# Patient Record
Sex: FEMALE | Race: WHITE | NOT HISPANIC OR LATINO | Employment: FULL TIME | URBAN - METROPOLITAN AREA
[De-identification: names, ages, dates, MRNs, and addresses within clinical notes are randomized per-mention and may not be internally consistent; named-entity substitution may affect disease eponyms.]

---

## 2021-06-14 ENCOUNTER — OFFICE VISIT (OUTPATIENT)
Dept: UROLOGY | Facility: CLINIC | Age: 36
End: 2021-06-14
Attending: OBSTETRICS & GYNECOLOGY
Payer: COMMERCIAL

## 2021-06-14 VITALS
SYSTOLIC BLOOD PRESSURE: 105 MMHG | HEART RATE: 66 BPM | WEIGHT: 134.5 LBS | HEIGHT: 64 IN | BODY MASS INDEX: 22.96 KG/M2 | DIASTOLIC BLOOD PRESSURE: 72 MMHG

## 2021-06-14 DIAGNOSIS — N39.3 STRESS INCONTINENCE OF URINE: ICD-10-CM

## 2021-06-14 DIAGNOSIS — R10.2 PAIN IN VESTIBULE OF VULVA: ICD-10-CM

## 2021-06-14 DIAGNOSIS — N81.6 RECTOCELE: Primary | ICD-10-CM

## 2021-06-14 PROCEDURE — G0463 HOSPITAL OUTPT CLINIC VISIT: HCPCS

## 2021-06-14 PROCEDURE — 99204 OFFICE O/P NEW MOD 45 MIN: CPT | Performed by: OBSTETRICS & GYNECOLOGY

## 2021-06-14 RX ORDER — EVENING PRIMROSE OIL 500 MG
CAPSULE ORAL
COMMUNITY
Start: 2021-06-10

## 2021-06-14 RX ORDER — COPPER 313.4 MG/1
INTRAUTERINE DEVICE INTRAUTERINE
COMMUNITY

## 2021-06-14 RX ORDER — CHOLECALCIFEROL (VITAMIN D3) 50 MCG
TABLET ORAL
COMMUNITY
Start: 2021-06-10

## 2021-06-14 RX ORDER — DIAPER,BRIEF,ADULT, DISPOSABLE
EACH MISCELLANEOUS
COMMUNITY
Start: 2021-06-10

## 2021-06-14 RX ORDER — LIDOCAINE HYDROCHLORIDE 40 MG/ML
SOLUTION TOPICAL
Qty: 50 ML | Refills: 3 | Status: SHIPPED | OUTPATIENT
Start: 2021-06-14

## 2021-06-14 RX ORDER — VITAMIN A ACETATE, .BETA.-CAROTENE, ASCORBIC ACID, CHOLECALCIFEROL, .ALPHA.-TOCOPHEROL ACETATE, DL-, THIAMINE MONONITRATE, RIBOFLAVIN, NIACINAMIDE, PYRIDOXINE HYDROCHLORIDE, FOLIC ACID, CYANOCOBALAMIN, CALCIUM CARBONATE, FERROUS FUMARATE, ZINC OXIDE, AND CUPRIC OXIDE 2000; 2000; 120; 400; 22; 1.84; 3; 20; 10; 1; 12; 200; 27; 25; 2 [IU]/1; [IU]/1; MG/1; [IU]/1; MG/1; MG/1; MG/1; MG/1; MG/1; MG/1; UG/1; MG/1; MG/1; MG/1; MG/1
1 TABLET ORAL
COMMUNITY

## 2021-06-14 ASSESSMENT — ANXIETY QUESTIONNAIRES
GAD7 TOTAL SCORE: 5
2. NOT BEING ABLE TO STOP OR CONTROL WORRYING: SEVERAL DAYS
5. BEING SO RESTLESS THAT IT IS HARD TO SIT STILL: NOT AT ALL
7. FEELING AFRAID AS IF SOMETHING AWFUL MIGHT HAPPEN: SEVERAL DAYS
3. WORRYING TOO MUCH ABOUT DIFFERENT THINGS: SEVERAL DAYS
6. BECOMING EASILY ANNOYED OR IRRITABLE: SEVERAL DAYS
1. FEELING NERVOUS, ANXIOUS, OR ON EDGE: SEVERAL DAYS

## 2021-06-14 ASSESSMENT — PATIENT HEALTH QUESTIONNAIRE - PHQ9
SUM OF ALL RESPONSES TO PHQ QUESTIONS 1-9: 2
5. POOR APPETITE OR OVEREATING: NOT AT ALL

## 2021-06-14 ASSESSMENT — PAIN SCALES - GENERAL: PAINLEVEL: NO PAIN (0)

## 2021-06-14 ASSESSMENT — MIFFLIN-ST. JEOR: SCORE: 1290.09

## 2021-06-14 NOTE — LETTER
Date:June 27, 2021      Patient was self referred, no letter generated. Do not send.        [facility] Health Information

## 2021-06-14 NOTE — PATIENT INSTRUCTIONS
Dear Swetha, it was nice to meet you today. Today we discussed that you have a mild rectocele and stress urinary incontinence      Today we discussed the following options for your stress urinary incontinence  -kegel exercises daily ( 20 in the morning and 20 at night) .Continue pelvic PT for this  --Minimizing  acidic (citrus fruits especially) , caffeinated , alcoholic or carbonated drinks or artificial sweetners as these are bladder irritants -Limiting fluid intake to 60-80 oz /day.   -avoiding drinking fluids after 6 pm to avoid incontinence during the night .   - We also discussed incontinence pessary as an option where you were a small silicone vaginal ring to provide support for your urethra and reduce stress incontinence symptoms. This will require cleaning pessary regularly. We have made a pessary fitting appointment for you.       If the above doesn't work, we can offer incontinence sling or urethral bulking agent injections. The incontinence slings generally have better outcomes. We discussed today various surgical options such as mesh sling/bladder neck suspension, and fascial sling as well as bulking agent injection. We can discuss more if surgery becomes something you like to pursue but this will have to wait till you are done with childbearing.     Please call our clinic with any questions at  or send a My chart message    Yuliya Mcrae MD, [facility]  Female Pelvic Medicine and Reconstructive Surgery  [facility]      Bladder Control Problems    If you or someone you know is affected by loss of bladder control, you are not alone. Urinary incontinence affects 30 to 50% of women; although the rates go up with age, incontinence among young women is quite common. This condition affects men and women, although it is nearly twice as common in women. The prevalence of urinary incontinence does increase with age, but it is not considered normal at any age. Doctors should  routinely question women over age 65 about bladder problems, including overactive bladder.  Certain events or conditions may make a woman more likely to experience urinary incontinence. Sometimes, very clear-cut events such as pregnancy, vaginal delivery, surgery, radiation or accidental injury can lead to these kinds of problems; other times, causes may be much less well-defined. Some other causes include:  ? Chronic constipation, which causes excessive bearing down.   ? Some lung conditions, where pressure from breathing disorders can increase the pressure in the abdomen and pelvis.   ? Neurological conditions, like multiple sclerosis or spina bifida, where nerves and/or muscles may not function correctly.   ? Certain occupations (usually those that involve heavy lifting or exertion) may also increase the risk.   ? Some kinds of urinary incontinence can be related to medications taken for other health conditions (such as diuretics), or smoking and caffeine use, and obesity certainly has an effect.   ? Uncommonly, certain other health conditions such as kidney or bladder stones, or even some forms of cancer can cause the bladder to lose urine.   ? And, in many cases, there is no obvious underlying reason for why bladder control problems occur.   Many women who have these kinds of bladder control problems are reluctant to discuss them with anyone, or are embarrassed to acknowledge that they have a problem, even to themselves. Sometimes women are made to feel that these conditions are  normal,  especially as they get older, and that, since bladder control problems like this are rarely life-threatening, they are not really a problem. These points of view are often shared among family and friends, or even among some healthcare providers.  But the truth of the matter is that urinary incontinence can have a very significant impact. We know that it can undermine your sense of well-being and self-worth, and your ability to  live your life the way you want. Scientific studies indicate that quality of life measures significantly decrease when a woman experiences these kinds of bladder control problems. After experiencing these problems, women may begin to stop exercising or participating in physical or social activities, which can further reduce health and quality of life. Work activities, travel and intimacy also may suffer as a result.  But there is no reason to allow this to continue. The good news is that 80 to 90 % of women who seek treatment will experience significant improvement. A wide array of treatment options, ranging from behavioral and diet changes all the way to surgical options exist, and are used every day to help women recover parts of their lives they may have let go. Get evaluated and review treatment options appropriate for your urinary incontinence. The more you know, the more confident you will be in choosing the direction of treatment.  Types of Bladder Control Problems  Female urinary incontinence can be grouped in several distinct categories, although women often have symptoms found in more than one category (i.e., mixed incontinence).  Stress Incontinence: Urine leakage occurs with increases in abdominal pressure (hence, mechanical  stress ).        Stress urinary incontinence is loss of urine that occurs at the same time as physical activities that increase abdominal pressure (such as sneezing, coughing, laughing, and exercising). These activities can increase the pressure within the bladder, which behaves like a balloon filled with liquid. The rise in pressure can push urine out through the urethra, especially when the support to the urethra has been weakened; this is what we call stress urinary incontinence.      Pregnancy and delivery can have significant effects on the mechanisms of continence. Obstetricians are becoming more and more aware of the risks of injury to the pelvic floor caused by vaginal  "delivery. Excessive stretching of the supportive tissues, muscles and nerves, can cause permanent defects even after post-pregnancy healing. This may lead to various pelvic floor support problems for the surrounding organs. Although the urinary incontinence often resolves in the first few months after delivery, its initial presentation may signal the development of more troublesome incontinence in the future.      Some women with stress incontinence may note only occasional leaks, only with aggressive exercise, colds or allergies, or at times when the bladder is especially full. Other women have a great deal of leakage with simple activities such as getting up out of a chair, or simple walking. Although the severity may vary, many women find that these symptoms begin to limit their physical or social activities, and can have a serious impact on quality of life.  Urge Incontinence: Often referred to as  overactive bladder.  Inability to hold urine long enough to reach restroom.       The term  overactive bladder  is sometimes used to refer to any of the following conditions:    Frequency (more than 8 voids in each 24 hours)     Urgency (a powerful urge to urinate, that is difficult to put off)     Nocturia (waking up twice or more at night to urinate)     Urge incontinence (leakage of urine associated with an urge to urinate, or not making it to the bathroom in time)       When leakage of urine is accompanied by a sensation of the need to urinate, or the impending sense that a large leak is going to happen, this is often what is known as urge incontinence. Unlike stress incontinence, this usually represents a bladder \"squeeze\" or contraction, occurring at an unwelcome time. Often, people with urge incontinence also have increased urinary frequency, have to rush to the bathroom frequently, or wake up more than once or twice at night to urinate. You may also notice severe urgency and leakage when driving into the " driveway, placing the key in the front door, running water or with temperature changes.      These are very common conditions, and their impact can vary widely. Their causes are less well understood than in stress incontinence. Occasionally, there may be an underlying reason, like neurologic or inflammatory conditions; in most cases, no particular cause can be identified. Whether or not an underlying cause is identified, the effects of overactive bladder and urge incontinence can be significant.  Mixed Incontinence: When two or more causes contribute to urinary incontinence. Often refers to the presence of both stress and urge incontinence. For example, someone has the combination of stress incontinence (leaking with coughing, sneezing, exercise, etc.) and urge incontinence (leaking along with a need to get to the bathroom), this is known as mixed urinary incontinence. Often, a woman may first experience one kind of leaking, and finds that the other begins to occur later.  Overflow Incontinence: Leakage or  spill-over  of urine when the quantity of urine exceeds the bladder's capacity to hold it. This generally happens when there is some blockage or obstruction to the bladder's emptying; the bladder is unable to empty well, and small amounts of leakage happen frequently. This kind of leakage is less common among women, unless they have had bladder surgery, vaginal prolapse, or some less common condition.  Functional Incontinence: Leakage (usually resulting from one or more causes) due to factors impairing your ability to reach the restroom in time because of physical conditions (e.g., arthritis). This may or may not represent a problem of the pelvic floor, but should certainly be addressed with a health-care provider.  Fistula or Diverticulum: When urine collects in a pouch within the urethra, or flows directly through an abnormal tract into the vagina. They usually happen after some kind of surgery, trauma or  radiation to the area and are relatively uncommon.      What is Pelvic Organ Prolapse?    Pelvic organ prolapse is a medical condition that occurs when the normal support of the vagina is lost, resulting in  sagging  or dropping of the bladder, urethra, cervix and rectum. As the prolapse of the vagina and uterus progresses, women can feel bulging tissue protruding through the opening of the vagina.     Causes and Risk Factors  By studying large numbers of women with and without prolapse, researchers and urogynecologists have identified certain risk factors that predispose, cause, promote or worsen pelvic organ prolapse. The strength of our bones, muscles and connective tissue are influenced by our genes and our race. Some women are born with weaker tissues and are therefore at risk to develop prolapse.  women are more likely than  women to develop pelvic organ prolapse. Loss of pelvic support can occur when any part of the pelvic floor is injured during vaginal delivery, surgery, pelvic radiation or back and pelvic fractures during falls or motor vehicle accidents. Hysterectomy and other procedures done to treat pelvic organ prolapse also are associated with future development of prolapse. Some other conditions that promote prolapse include: constipation and chronic straining, smoking, chronic coughing and heavy lifting. Obesity, like smoking, is one of the few modifiable risk factors. Women who are obese have a 40 to 75% increased risk of pelvic organ prolapse. Aging, menopause, debilitating nerve and muscle diseases contribute to the deterioration of pelvic floor strength and the development of prolapse.   Incidence  We do not know exactly how common pelvic organ prolapse is because research is limited to women who seek health care. It is estimated that nearly 50% of all women between the ages of 50 and 79 have some form of prolapse. The lifetime risk that a woman will have surgery for the  correction of prolapse or urinary incontinence in the [place] is about 11%. We also know that only one-third of these women will undergo repeat corrective surgery for these conditions. Approximately 300,000 procedures for correction of pelvic organ prolapse are performed each year in the [place]. We believe that is just the tip of the iceberg as many women manage their prolapse without surgery.     Symptoms  Some loss of support is a very common finding upon physical exam in women, many of whom do not have bothersome symptoms. Those women who are uncomfortable often describe the very first signs as subtle--such as an inability to keep a tampon inside the vagina, dampness in underwear or discomfort due to dryness during intercourse.   As the prolapse gets worse, some women complain of:     A bulging, pressure or heavy sensation in the vagina that worsens by the end of the day or during bowel movements     The feeling that they are  sitting on a ball      Needing to push stool out of the rectum by placing their fingers into the vagina during bowel movement     Difficulty starting to urinate, a weak or spraying stream of urine     Urinary frequency or the sensation that they are not emptying their bladder well     The need to lift up the bulging vagina or uterus to start urination     Urine leakage with intercourse     Types of Prolapse   Anterior Vaginal Prolapse (also known as cystocele)  This type of prolapse occurs when the wall between the vagina and the bladder stretches or detaches from its attachment on the pelvic bones. This loss of support allows the bladder to prolapse or fall down into the vagina.   Most women do not have symptoms when the anterior vaginal prolapse is mild. As it progresses outside the opening of the vagina, the prolapsed bladder may not empty well which can lead to urinary frequency, night time voiding, loss of bladder control and recurrent bladder infections. Strengthening  pelvic muscles may improve the support to the bladder and neighboring organs and reduce symptoms. In addition, women can get temporary support by wearing a device called a vaginal pessary. It works much like a knee or ankle brace would support a weak joint. When these efforts are inadequate surgery is available to elevate the bladder and other internal organs to their proper position.     Posterior Vaginal Prolapse (also known as rectocele)  Weakening and stretching of the back wall of the vagina allows the rectum to bulge into and out of the vagina. Most often, the damage to the back wall of the vagina occurs during vaginal childbirth, although not everyone who has delivered a child vaginally will develop a rectocele. Mild rectoceles rarely cause symptoms. However, straining with constipation puts significant pressure on the weak vaginal wall and can further thin it out. Avoiding constipation may prevent progression and also reduce symptoms from the rectocele. Some women may find benefit from pelvic floor muscle strengthening and vaginal pessaries. When these low risk interventions are insufficient to relieve symptoms, surgery is performed to reinforce the posterior vaginal wall. This picture shows what a rectocele looks like from the outside.     Uterine Prolapse  When the supporting ligaments and muscles of the pelvic floor that keep the uterus in the pelvis are damaged, the cervix and uterus descend into and eventually out of the vagina. Often, uterine prolapse is associated with loss of vaginal wall support (cystocele, rectocele). When the cervix protrudes outside the vagina, it can develop ulcers from rubbing on underwear or protective pads. There is a risk that these ulcers will bleed and become infected. This picture shows what uterine prolapse looks like from the outside. As with other forms of prolapse, it is not until the uterine descent is bothersome that treatment is necessary. Women who have uterine  prolapse often report pelvic pressure, the need to sit or lay down to relieve the discomfort, a sensation that their insides are falling out, difficulty emptying their bladder and urine leakage. Strengthening the pelvic muscles with Kegel exercises, avoiding heavy lifting, constipation, and weight gain may reduce the risk of progression of uterine descent. Additional treatment options include pessary devices which provide support when worn or surgery.     Vaginal Prolapse after Hysterectomy  If a woman has already had a hysterectomy, the very top of the vagina (where the uterus used to be) can become detached from its supporting ligaments. This can results in the tube of the vagina turning inside out. This condition is also known as vaginal  vault  prolapse. Depending upon how extensively the top of the vagina is turning inside out, one or several pelvic organs (such as the bladder, small and large bowel) will prolapse into the protruding bulge. Symptoms depend on which organs prolapse. When the bladder is involved, women report difficulty in starting to urinate, and emptying their bladder well. If it is the bowel then many report the need to push up the vaginal bulge and strain to have a bowel movement. Skin sores may develop if the bulge is rubbing on pads or underwear. A pessary may provide support for the bulge otherwise surgery is recommended. This is a picture of a vaginal vault prolapse.     Rectal Prolapse  The rectum is the name given to the last 6 inches of the colon. Like the vagina and uterus, the rectum is normally securely attached to the bony pelvis by ligaments and muscles. Infrequently, the supporting structures stretch or detach from the rectal wall which results in the rectum relapsing through the anus. This looks like red, often donut shaped soft tissue coming through the anus. Early on, it is most often noticed on the toilet after a bowel movement, and can be confused with a large hemorrhoid.  Conditions associated with straining such as chronic constipation or diarrhea, nerve and muscle weakness (paralysis or multiple sclerosis) and advancing age are risk factors for rectal prolapse. Women with rectal prolapse often report the following symptoms: pain during bowel movements, mucus or blood discharge from the protruding tissue, loss of control of bowel movements, and soft, red tissue protruding from the anus. It is very important to be clear in describing where the bulging tissue is coming from (opening of the anus or the vagina) when you seek help as both conditions may be present simultaneously. Treatment for a rectocele and rectal prolapse are different.     Source:  [employer]  Original publication date: May, 2008;  Website:  [email or web address]

## 2021-06-14 NOTE — LETTER
2021       RE: Swetha Raza  [address]  [place] [address]     Dear Colleague,    Thank you for referring your patient, Swetha Raza, to the [facility] [place] at [facility]. Please see a copy of my visit note below.    2021    Referring Provider: Referred Self, MD  No address on file    Primary Care Provider: No primary care provider on file.    CC: Pelvic organ prolapse    HPI:  Swetha Raza is a 35 year old female  s/p last vaginal delivery on 3/23/21who presents for evaluation of pelvic organ prolapse and incontinence    Urinary Symptoms/Voiding function  Has had stress urinary incontinence for several years. Leaks with exercise, movement, coughing etc. Currently doing pelvic floor physical therapy which she started during her pregnancy. She does not have urgency leakage though has urinary frequency. She drinks approximately 120 oz of water per day ( including some carbonated water) but no other bladder irritants. She feels like she empties her bladder ok. Used to have recurrent UTIs in the past but last infection was over a year ago.     Pelvic Organ Prolapse Symptoms  Has sensation of bulge/pressure since the delivery of her last baby 3 months ago and has been told she has prolapse. She says pelvic PT is helping (also biofeedback) and her symptoms are less frequent. She hasn't noticed protrusion.     Gastrointestinal Symptoms:  Takes miralax for constipation and it is helping. Does not splint to defecate. No fecal incontinence    Sexual function/Pelvic floor pain/GYN:   Sexually active. Some pain with intercourse mostly with impact on her cervix.   IUD was placed post partum and she still has it.     Relevant Medical History:    Diabetes? no  High Blood pressure? no     Recurrent UTIs? Yes but resolved  Sleep Apnea? no  Obesity? no  History of Blood clots? no  Other medical problems: Had heart  palpitations during pregnancy with negative Echo on 1/28/21.     Echocardiogram 1/28/21.   Interpretation Summary   * The left ventricular systolic function is normal, estimated LVEF 55-60%.   * Left ventricular wall motion is normal.   * Normal right ventricular size and systolic function.   * There is no hemodynamically significant valvular heart disease.   * No pulmonary hypertension, estimated right ventricular systolic pressure  is 22 mmHg.   * No prior study.    Recommendations   * This is a normal study.    Surgical History:      No past surgical history on file.    OB/Gyn History:  OB History   No obstetric history on file.       Medications/Vitamins/Supplements:   Current Outpatient Medications   Medication     Evening Primrose Oil 500 MG CAPS     lecithin (LECITHIN) 1200 MG CAPS capsule     lidocaine (XYLOCAINE) 4 % external solution     vitamin B complex with vitamin C (VITAMIN  B COMPLEX) tablet     vitamin D3 (CHOLECALCIFEROL) 50 mcg (2000 units) tablet     paragard intrauterine copper device     Prenatal Vit-Fe Fumarate-FA (PNV PRENATAL PLUS MULTIVITAMIN) 27-1 MG TABS per tablet     No current facility-administered medications for this visit.          Medical History:      No past medical history on file.  ROS  Social History    Social History     Socioeconomic History     Marital status:      Spouse name: Not on file     Number of children: Not on file     Years of education: Not on file     Highest education level: Not on file   Occupational History     Not on file   Social Needs     Financial resource strain: Not on file     Food insecurity     Worry: Not on file     Inability: Not on file     Transportation needs     Medical: Not on file     Non-medical: Not on file   Tobacco Use     Smoking status: Not on file   Substance and Sexual Activity     Alcohol use: Not on file     Drug use: Not on file     Sexual activity: Not on file   Lifestyle     Physical activity     Days per week: Not on  "file     Minutes per session: Not on file     Stress: Not on file   Relationships     Social connections     Talks on phone: Not on file     Gets together: Not on file     Attends Congregation service: Not on file     Active member of club or organization: Not on file     Attends meetings of clubs or organizations: Not on file     Relationship status: Not on file     Intimate partner violence     Fear of current or ex partner: Not on file     Emotionally abused: Not on file     Physically abused: Not on file     Forced sexual activity: Not on file   Other Topics Concern     Not on file   Social History Narrative     Not on file       Family History  No family history on file.    Allergy    Not on File    No current outpatient medications on file.     No current facility-administered medications for this visit.        Physical Exam: /72   Pulse 66   Ht 1.626 m (5' 4\")   Wt 61 kg (134 lb 8 oz)   BMI 23.09 kg/m      There were no vitals taken for this visit. No LMP recorded. There is no height or weight on file to calculate BMI.    Gen:  is alert, comfortable in no acute distress,   Abdomen: Abdomen is soft, non-tender, non-distended,   Lungs: non-labored breathing.      Pelvic Exam:   Normal external female genitalia. The urethra was normal.    Vagina:  Normal mucosa, stage 2 posterior defect, good apical and anterior support, no abnormal discharge  Uterus: normal size, non tender  Ovaries: non palpable, no adnexal masses palpated  Vulva: vestibular pain at 7 oclock position with a small area of inflammation, 3/4 pain.   Rectal: deferred    Pelvic floor strength: 1/5 kegels.    Pelvic floor muscles: No levator myalgia.     POPQ EXAM FOR PROLAPSE SEVERITY  (Aa):   -2 (Ba):   -2 (C ):    -6   (GH):   3.5 (PB):  2 (TVL):  9   (Ap):   -1 (Bp):  -1 (D):   -7     ICS Stage (1-4):  2    Voiding trial:    VOID 160 ml  PVR 55 mL by Bladder ultrasound  Leak with Cough stress test : No, Prolapse reduced No    Labs:   No " results found for: COLOR, APPEARANCE, URINEGLC, URINEBILI, URINEKETONE, SG, URINEPH, PROTEIN, UROBILINOGEN, NITRITE, LEUKEST  CBC RESULTS: No results for input(s): WBC, RBC, HGB, HCT, MCV, MCH, MCHC, RDW, PLT in the last 61093 hours.  @lastcmp@    A/P: Swetha Raza is a 35 year old F with   Swetha was seen today for uterine prolapse.    Diagnoses and all orders for this visit:    Rectocele    Pain in vestibule of vulva  -     lidocaine (XYLOCAINE) 4 % external solution; Apply a small amount with a cotton ball on the vaginal opening 1 minute or so before intercourse to reduce pain    Stress incontinence of urine        We discussed today that she does have a stage 2 rectocele but the rest of her vaginal support is normal. She is doing ok with her bowels currently and her pressure symptom is improving with Pelvic PT  We discussed that given that this is only 3 months post partum, her body will continue to improve with physical therapy and definitely when she is no longer breast feeding and has normal estrogen levels. If symptoms of pressure/bulge in vagina worse after this post partum year, then we can consider surgery if needed.      For her stress incontinence, she would like to try pessary. We will bring her in for pessary fitting and teaching. This may also help her rectocele some.      I spent a total of 45 minutes with  Swetha Raza  on the date of the encounter in chart review, face to face patient visit, review of tests, documentation and/or discussion with other providers about the issues documented above.     Yuliya Mcrae MD, [facility]  , Department of OBGYN  Female Pelvic Medicine and Reconstructive Surgery ( Urogynecology)    No care team member to display  SELF, REFERRED                  Again, thank you for allowing me to participate in the care of your patient.      Sincerely,    Yuliya Mcrae MD

## 2021-06-14 NOTE — PROGRESS NOTES
2021    Referring Provider: Referred Self, MD  No address on file    Primary Care Provider: No primary care provider on file.    CC: Pelvic organ prolapse    HPI:  Swetha Raza is a 35 year old female  s/p last vaginal delivery on 3/23/21who presents for evaluation of pelvic organ prolapse and incontinence    Urinary Symptoms/Voiding function  Has had stress urinary incontinence for several years. Leaks with exercise, movement, coughing etc. Currently doing pelvic floor physical therapy which she started during her pregnancy. She does not have urgency leakage though has urinary frequency. She drinks approximately 120 oz of water per day ( including some carbonated water) but no other bladder irritants. She feels like she empties her bladder ok. Used to have recurrent UTIs in the past but last infection was over a year ago.     Pelvic Organ Prolapse Symptoms  Has sensation of bulge/pressure since the delivery of her last baby 3 months ago and has been told she has prolapse. She says pelvic PT is helping (also biofeedback) and her symptoms are less frequent. She hasn't noticed protrusion.     Gastrointestinal Symptoms:  Takes miralax for constipation and it is helping. Does not splint to defecate. No fecal incontinence    Sexual function/Pelvic floor pain/GYN:   Sexually active. Some pain with intercourse mostly with impact on her cervix.   IUD was placed post partum and she still has it.     Relevant Medical History:    Diabetes? no  High Blood pressure? no     Recurrent UTIs? Yes but resolved  Sleep Apnea? no  Obesity? no  History of Blood clots? no  Other medical problems: Had heart palpitations during pregnancy with negative Echo on 21.     Echocardiogram 21.   Interpretation Summary   * The left ventricular systolic function is normal, estimated LVEF 55-60%.   * Left ventricular wall motion is normal.   * Normal right ventricular size and systolic function.   * There is no hemodynamically  significant valvular heart disease.   * No pulmonary hypertension, estimated right ventricular systolic pressure  is 22 mmHg.   * No prior study.    Recommendations   * This is a normal study.    Surgical History:      No past surgical history on file.    OB/Gyn History:  OB History   No obstetric history on file.       Medications/Vitamins/Supplements:   Current Outpatient Medications   Medication     Evening Primrose Oil 500 MG CAPS     lecithin (LECITHIN) 1200 MG CAPS capsule     lidocaine (XYLOCAINE) 4 % external solution     vitamin B complex with vitamin C (VITAMIN  B COMPLEX) tablet     vitamin D3 (CHOLECALCIFEROL) 50 mcg (2000 units) tablet     paragard intrauterine copper device     Prenatal Vit-Fe Fumarate-FA (PNV PRENATAL PLUS MULTIVITAMIN) 27-1 MG TABS per tablet     No current facility-administered medications for this visit.          Medical History:      No past medical history on file.  ROS  Social History    Social History     Socioeconomic History     Marital status:      Spouse name: Not on file     Number of children: Not on file     Years of education: Not on file     Highest education level: Not on file   Occupational History     Not on file   Social Needs     Financial resource strain: Not on file     Food insecurity     Worry: Not on file     Inability: Not on file     Transportation needs     Medical: Not on file     Non-medical: Not on file   Tobacco Use     Smoking status: Not on file   Substance and Sexual Activity     Alcohol use: Not on file     Drug use: Not on file     Sexual activity: Not on file   Lifestyle     Physical activity     Days per week: Not on file     Minutes per session: Not on file     Stress: Not on file   Relationships     Social connections     Talks on phone: Not on file     Gets together: Not on file     Attends Gnosticism service: Not on file     Active member of club or organization: Not on file     Attends meetings of clubs or organizations: Not on file  "    Relationship status: Not on file     Intimate partner violence     Fear of current or ex partner: Not on file     Emotionally abused: Not on file     Physically abused: Not on file     Forced sexual activity: Not on file   Other Topics Concern     Not on file   Social History Narrative     Not on file       Family History  No family history on file.    Allergy    Not on File    No current outpatient medications on file.     No current facility-administered medications for this visit.        Physical Exam: /72   Pulse 66   Ht 1.626 m (5' 4\")   Wt 61 kg (134 lb 8 oz)   BMI 23.09 kg/m      There were no vitals taken for this visit. No LMP recorded. There is no height or weight on file to calculate BMI.    Gen:  is alert, comfortable in no acute distress,   Abdomen: Abdomen is soft, non-tender, non-distended,   Lungs: non-labored breathing.      Pelvic Exam:   Normal external female genitalia. The urethra was normal.    Vagina:  Normal mucosa, stage 2 posterior defect, good apical and anterior support, no abnormal discharge  Uterus: normal size, non tender  Ovaries: non palpable, no adnexal masses palpated  Vulva: vestibular pain at 7 oclock position with a small area of inflammation, 3/4 pain.   Rectal: deferred    Pelvic floor strength: 1/5 kegels.    Pelvic floor muscles: No levator myalgia.     POPQ EXAM FOR PROLAPSE SEVERITY  (Aa):   -2 (Ba):   -2 (C ):    -6   (GH):   3.5 (PB):  2 (TVL):  9   (Ap):   -1 (Bp):  -1 (D):   -7     ICS Stage (1-4):  2    Voiding trial:    VOID 160 ml  PVR 55 mL by Bladder ultrasound  Leak with Cough stress test : No, Prolapse reduced No    Labs:   No results found for: COLOR, APPEARANCE, URINEGLC, URINEBILI, URINEKETONE, SG, URINEPH, PROTEIN, UROBILINOGEN, NITRITE, LEUKEST  CBC RESULTS: No results for input(s): WBC, RBC, HGB, HCT, MCV, MCH, MCHC, RDW, PLT in the last 14600 hours.  @lastcmp@    A/P: Swetha Raza is a 35 year old F with   Swetha was seen today for uterine " prolapse.    Diagnoses and all orders for this visit:    Rectocele    Pain in vestibule of vulva  -     lidocaine (XYLOCAINE) 4 % external solution; Apply a small amount with a cotton ball on the vaginal opening 1 minute or so before intercourse to reduce pain    Stress incontinence of urine        We discussed today that she does have a stage 2 rectocele but the rest of her vaginal support is normal. She is doing ok with her bowels currently and her pressure symptom is improving with Pelvic PT  We discussed that given that this is only 3 months post partum, her body will continue to improve with physical therapy and definitely when she is no longer breast feeding and has normal estrogen levels. If symptoms of pressure/bulge in vagina worse after this post partum year, then we can consider surgery if needed.      For her stress incontinence, she would like to try pessary. We will bring her in for pessary fitting and teaching. This may also help her rectocele some.      I spent a total of 45 minutes with  Swetha Raza  on the date of the encounter in chart review, face to face patient visit, review of tests, documentation and/or discussion with other providers about the issues documented above.     Yuliya Mcrae MD, [facility]  , Department of OBGYN  Female Pelvic Medicine and Reconstructive Surgery ( Urogynecology)  CC  No care team member to display  SELF, REFERRED

## 2021-06-15 ASSESSMENT — ANXIETY QUESTIONNAIRES: GAD7 TOTAL SCORE: 5

## 2021-06-20 ENCOUNTER — HEALTH MAINTENANCE LETTER (OUTPATIENT)
Age: 36
End: 2021-06-20

## 2021-07-19 ENCOUNTER — OFFICE VISIT (OUTPATIENT)
Dept: UROLOGY | Facility: CLINIC | Age: 36
End: 2021-07-19
Attending: OBSTETRICS & GYNECOLOGY
Payer: COMMERCIAL

## 2021-07-19 VITALS
DIASTOLIC BLOOD PRESSURE: 72 MMHG | SYSTOLIC BLOOD PRESSURE: 108 MMHG | HEART RATE: 64 BPM | WEIGHT: 131.2 LBS | BODY MASS INDEX: 22.4 KG/M2 | HEIGHT: 64 IN

## 2021-07-19 DIAGNOSIS — N39.3 STRESS INCONTINENCE OF URINE: Primary | ICD-10-CM

## 2021-07-19 PROCEDURE — 57160 INSERT PESSARY/OTHER DEVICE: CPT | Performed by: OBSTETRICS & GYNECOLOGY

## 2021-07-19 PROCEDURE — 99214 OFFICE O/P EST MOD 30 MIN: CPT | Mod: 25 | Performed by: OBSTETRICS & GYNECOLOGY

## 2021-07-19 PROCEDURE — G0463 HOSPITAL OUTPT CLINIC VISIT: HCPCS

## 2021-07-19 ASSESSMENT — PAIN SCALES - GENERAL: PAINLEVEL: NO PAIN (0)

## 2021-07-19 ASSESSMENT — MIFFLIN-ST. JEOR: SCORE: 1275.37

## 2021-07-19 NOTE — LETTER
7/19/2021       RE: Swetha Raza  [address]  [place] [address]     Dear Colleague,    Thank you for referring your patient, Swetha Raza, to the [facility] [place] at [facility]. Please see a copy of my visit note below.    Ms Raza is here for a scheduled pessary fitting for her stress incontinence and rectocele. Today she says that her symptoms are slightly improved with pelvic floor physical therapy.         Pessary fitting  Patient verbally consented for pessary fitting. A 2 size dish with support pessary fitted . Stayed in with valsalva and squatting      Assessment and plan  Swetha was seen today for pessary check/fit/insert.    Diagnoses and all orders for this visit:    Stress incontinence of urine  -     PESSARY, NON RUBBER,ANY TYPE  -     Fit/Insert Intravaginal Support Device/Pessary (95088)      # 2 dish with support and knob pessary fitted . Patient instructed on how to remove and clean regulary        I spent a total of 30 minutes face-to-face with Swetha Raza on the date of the encounter in chart review, patient visit, review of tests, documentation and/or discussion with other providers about the issues documented above.         Again, thank you for allowing me to participate in the care of your patient.      Sincerely,    Yuliya Mcrae MD

## 2021-07-19 NOTE — NURSING NOTE
pATIENT WAS ABLE TO VOID 200 ML OF URINE WITH THE PESSARY INSERTED.  FEELING NO PAIN WITH MOVEMENT.  PATIENT WAS TAUGHT HOW TO REMOVE AND INSERT HER PESSARY  INCONTINENCE RING WITH SUPPORT  SIZE TWO.

## 2021-07-19 NOTE — LETTER
Date:August 6, 2021      Patient was self referred, no letter generated. Do not send.        [facility] Health Information

## 2021-07-19 NOTE — PROGRESS NOTES
Ms Raza is here for a scheduled pessary fitting for her stress incontinence and rectocele. Today she says that her symptoms are slightly improved with pelvic floor physical therapy.         Pessary fitting  Patient verbally consented for pessary fitting. A 2 size dish with support pessary fitted . Stayed in with valsalva and squatting      Assessment and plan  Swetha was seen today for pessary check/fit/insert.    Diagnoses and all orders for this visit:    Stress incontinence of urine  -     PESSARY, NON RUBBER,ANY TYPE  -     Fit/Insert Intravaginal Support Device/Pessary (24833)      # 2 dish with support and knob pessary fitted . Patient instructed on how to remove and clean regulary        I spent a total of 30 minutes face-to-face with Swetha Raza on the date of the encounter in chart review, patient visit, review of tests, documentation and/or discussion with other providers about the issues documented above.

## 2021-07-19 NOTE — PATIENT INSTRUCTIONS
Dear Swetha, sujata to see you today.     We have fitted you with pessary with support for your stress incontinence. Ok to remove and clean daily or 2-3 three times a week or once a week depending on your comfort and ease of removal. When you do remove your pessary , please leave it out over night before putting it back in .     You may have some small vaginal bleeding when you take the pessary in and out from small tears. That is ok. However, if you have persistent bleeding, please call us and let us know as we will need to examin you to make sure the pessary is not causing trauma to the vagina and also to make sure that the source of the bleeding is not the uterus.     I would like to see you once a year for a pelvic exam while you are using pessary to make sure it is not causing any problems for the vagina      Yuliya Mcrae MD, [facility]    Division of Female Pelvic Medicine and Reconstructive Surgery

## 2021-08-02 ENCOUNTER — VIRTUAL VISIT (OUTPATIENT)
Dept: UROLOGY | Facility: CLINIC | Age: 36
End: 2021-08-02
Attending: OBSTETRICS & GYNECOLOGY
Payer: COMMERCIAL

## 2021-08-02 DIAGNOSIS — N39.3 STRESS INCONTINENCE OF URINE: Primary | ICD-10-CM

## 2021-08-02 PROCEDURE — 99213 OFFICE O/P EST LOW 20 MIN: CPT | Mod: GT | Performed by: OBSTETRICS & GYNECOLOGY

## 2021-08-02 NOTE — PROGRESS NOTES
Swetha Raza is a 36 year old year old who is being evaluated via a billable video visit.      How would you like to obtain your AVS? MyChart  If the video visit is dropped, the invitation should be resent by:phone [phone]  Will anyone else be joining your video visit? No      Video Start Time: 2:50     Ms Raza is here for video visit to follow up on how she is doing with her new pessary for her stress urinary incontinence. I saw her on 7/19/21 and fitted her with a #2 dish pessary with support and knob. Today she says that her stress incontinence is actually improving just with pelvic floor exercises and time. She is 4 months post partum. She attempted to use the pessary but says there was some discomfort and so she decided to hold off for now but would consider using it if she starts more high impact activities like running. She also has plans to see her pelvic floor Physical therapist     Objective:   Deferred ( virtual visit)     Asssessment and plan  Encounter Diagnosis   Name Primary?     Stress incontinence of urine Yes     Doing better overall  Pessary as needed for high impact activities  Follow up with me in a year if she uses pessary or prn depending on symptoms            Video-Visit Details    Type of service:  Video Visit    Video End Time:3:07 PM    Originating Location (pt. Location): Home    Distant Location (provider location):  [facility]     Platform used for Video Visit: Near Page        I spent a total of 20 minutes on  Video with  Swetha Raza on the date of the encounter in chart review, patient visit, review of tests, documentation and/or discussion with other providers about the issues documented above.

## 2021-08-02 NOTE — LETTER
8/2/2021       RE: Swetha Raza  [address]  [place] [address]     Dear Colleague,    Thank you for referring your patient, Swetha Raza, to the [facility] at [facility]. Please see a copy of my visit note below.    Swetha Raza is a 36 year old year old who is being evaluated via a billable video visit.      How would you like to obtain your AVS? MyChart  If the video visit is dropped, the invitation should be resent by:phone [phone]  Will anyone else be joining your video visit? No      Video Start Time: 2:50     Ms Raza is here for video visit to follow up on how she is doing with her new pessary for her stress urinary incontinence. I saw her on 7/19/21 and fitted her with a #2 dish pessary with support and knob. Today she says that her stress incontinence is actually improving just with pelvic floor exercises and time. She is 4 months post partum. She attempted to use the pessary but says there was some discomfort and so she decided to hold off for now but would consider using it if she starts more high impact activities like running. She also has plans to see her pelvic floor Physical therapist     Objective:   Deferred ( virtual visit)     Asssessment and plan  Encounter Diagnosis   Name Primary?     Stress incontinence of urine Yes     Doing better overall  Pessary as needed for high impact activities  Follow up with me in a year if she uses pessary or prn depending on symptoms            Video-Visit Details    Type of service:  Video Visit    Video End Time:3:07 PM    Originating Location (pt. Location): Home    Distant Location (provider location):  [facility]     Platform used for Video Visit: Clarisa        I spent a total of 20 minutes on  Video with  Swetha Raza on the date of the encounter in chart review, patient visit, review of tests, documentation and/or  discussion with other providers about the issues documented above.         Again, thank you for allowing me to participate in the care of your patient.      Sincerely,    Yuliya Mcrae MD

## 2021-08-02 NOTE — NURSING NOTE
When using the pessary   Feeling a dull ache in the front area   So she stopped using it after the first day.

## 2021-08-02 NOTE — LETTER
Date:August 6, 2021      Patient was self referred, no letter generated. Do not send.        [facility] Health Information

## 2021-10-11 ENCOUNTER — HEALTH MAINTENANCE LETTER (OUTPATIENT)
Age: 36
End: 2021-10-11

## 2022-07-17 ENCOUNTER — HEALTH MAINTENANCE LETTER (OUTPATIENT)
Age: 37
End: 2022-07-17

## 2022-09-25 ENCOUNTER — HEALTH MAINTENANCE LETTER (OUTPATIENT)
Age: 37
End: 2022-09-25

## 2023-08-05 ENCOUNTER — HEALTH MAINTENANCE LETTER (OUTPATIENT)
Age: 38
End: 2023-08-05